# Patient Record
Sex: FEMALE | Race: WHITE | NOT HISPANIC OR LATINO | Employment: FULL TIME | URBAN - METROPOLITAN AREA
[De-identification: names, ages, dates, MRNs, and addresses within clinical notes are randomized per-mention and may not be internally consistent; named-entity substitution may affect disease eponyms.]

---

## 2023-03-01 PROBLEM — R10.9 ABDOMINAL PAIN: Status: ACTIVE | Noted: 2023-03-01

## 2023-03-01 PROBLEM — K59.00 CONSTIPATION: Status: ACTIVE | Noted: 2023-03-01

## 2023-03-01 PROBLEM — R00.2 PALPITATIONS: Status: ACTIVE | Noted: 2023-03-01

## 2023-03-01 PROBLEM — L25.9 CONTACT DERMATITIS: Status: ACTIVE | Noted: 2023-03-01

## 2023-03-01 PROBLEM — M81.0 OSTEOPOROSIS: Status: ACTIVE | Noted: 2023-03-01

## 2023-03-01 PROBLEM — K21.9 GERD (GASTROESOPHAGEAL REFLUX DISEASE): Status: ACTIVE | Noted: 2023-03-01

## 2023-03-01 PROBLEM — K58.0 IRRITABLE BOWEL SYNDROME WITH DIARRHEA: Status: ACTIVE | Noted: 2023-03-01

## 2023-03-01 PROBLEM — K42.9 UMBILICAL HERNIA WITHOUT OBSTRUCTION AND WITHOUT GANGRENE: Status: ACTIVE | Noted: 2023-03-01

## 2023-03-01 PROBLEM — R42 DIZZINESS: Status: ACTIVE | Noted: 2023-03-01

## 2023-03-01 PROBLEM — R23.2 FLUSHING: Status: ACTIVE | Noted: 2023-03-01

## 2023-03-01 PROBLEM — R76.8 POSITIVE ANA (ANTINUCLEAR ANTIBODY): Status: ACTIVE | Noted: 2023-03-01

## 2023-03-01 PROBLEM — R51.9 HEADACHE: Status: ACTIVE | Noted: 2023-03-01

## 2023-03-01 PROBLEM — R32 URINARY INCONTINENCE: Status: ACTIVE | Noted: 2023-03-01

## 2023-03-01 PROBLEM — M54.9 BACK PAIN: Status: ACTIVE | Noted: 2023-03-01

## 2023-03-01 PROBLEM — M79.10 MYALGIA: Status: ACTIVE | Noted: 2023-03-01

## 2023-03-01 PROBLEM — D64.9 ANEMIA: Status: ACTIVE | Noted: 2023-03-01

## 2023-03-01 PROBLEM — R74.8 ELEVATED LIVER ENZYMES: Status: ACTIVE | Noted: 2023-03-01

## 2023-03-01 PROBLEM — R14.0 BLOATING: Status: ACTIVE | Noted: 2023-03-01

## 2023-03-01 PROBLEM — R30.0 DYSURIA: Status: ACTIVE | Noted: 2023-03-01

## 2023-03-01 PROBLEM — K63.89 INTESTINAL BACTERIAL OVERGROWTH: Status: ACTIVE | Noted: 2023-03-01

## 2023-03-01 PROBLEM — F41.9 ANXIETY: Status: ACTIVE | Noted: 2023-03-01

## 2023-03-01 RX ORDER — ACETAMINOPHEN 500 MG
100 TABLET ORAL
COMMUNITY

## 2023-03-01 RX ORDER — PANTOPRAZOLE SODIUM 40 MG/1
40 TABLET, DELAYED RELEASE ORAL DAILY
COMMUNITY
Start: 2019-08-05 | End: 2023-06-14 | Stop reason: SDUPTHER

## 2023-03-07 ENCOUNTER — APPOINTMENT (OUTPATIENT)
Dept: PRIMARY CARE | Facility: CLINIC | Age: 48
End: 2023-03-07
Payer: COMMERCIAL

## 2023-06-14 ENCOUNTER — LAB (OUTPATIENT)
Dept: LAB | Facility: LAB | Age: 48
End: 2023-06-14
Payer: COMMERCIAL

## 2023-06-14 ENCOUNTER — OFFICE VISIT (OUTPATIENT)
Dept: PRIMARY CARE | Facility: CLINIC | Age: 48
End: 2023-06-14
Payer: COMMERCIAL

## 2023-06-14 VITALS
DIASTOLIC BLOOD PRESSURE: 80 MMHG | HEIGHT: 65 IN | WEIGHT: 126 LBS | OXYGEN SATURATION: 97 % | SYSTOLIC BLOOD PRESSURE: 116 MMHG | HEART RATE: 70 BPM | BODY MASS INDEX: 20.99 KG/M2 | TEMPERATURE: 98.1 F

## 2023-06-14 DIAGNOSIS — J30.1 ALLERGIC RHINITIS DUE TO POLLEN, UNSPECIFIED SEASONALITY: ICD-10-CM

## 2023-06-14 DIAGNOSIS — R53.82 CHRONIC FATIGUE: ICD-10-CM

## 2023-06-14 DIAGNOSIS — Z13.220 LIPID SCREENING: ICD-10-CM

## 2023-06-14 DIAGNOSIS — K21.00 GASTROESOPHAGEAL REFLUX DISEASE WITH ESOPHAGITIS, UNSPECIFIED WHETHER HEMORRHAGE: ICD-10-CM

## 2023-06-14 DIAGNOSIS — D64.9 ANEMIA, UNSPECIFIED TYPE: ICD-10-CM

## 2023-06-14 DIAGNOSIS — M32.8 OTHER FORMS OF SYSTEMIC LUPUS ERYTHEMATOSUS, UNSPECIFIED ORGAN INVOLVEMENT STATUS (MULTI): ICD-10-CM

## 2023-06-14 DIAGNOSIS — Z12.31 VISIT FOR SCREENING MAMMOGRAM: ICD-10-CM

## 2023-06-14 DIAGNOSIS — M25.50 PAIN IN JOINT, MULTIPLE SITES: ICD-10-CM

## 2023-06-14 DIAGNOSIS — M25.50 ARTHRALGIA OF MULTIPLE JOINTS: ICD-10-CM

## 2023-06-14 DIAGNOSIS — R76.8 POSITIVE ANA (ANTINUCLEAR ANTIBODY): Primary | ICD-10-CM

## 2023-06-14 DIAGNOSIS — R76.8 POSITIVE ANA (ANTINUCLEAR ANTIBODY): ICD-10-CM

## 2023-06-14 LAB
ALANINE AMINOTRANSFERASE (SGPT) (U/L) IN SER/PLAS: 15 U/L (ref 7–45)
ALBUMIN (G/DL) IN SER/PLAS: 4.2 G/DL (ref 3.4–5)
ALKALINE PHOSPHATASE (U/L) IN SER/PLAS: 35 U/L (ref 33–110)
ANION GAP IN SER/PLAS: 11 MMOL/L (ref 10–20)
ASPARTATE AMINOTRANSFERASE (SGOT) (U/L) IN SER/PLAS: 17 U/L (ref 9–39)
BILIRUBIN TOTAL (MG/DL) IN SER/PLAS: 0.5 MG/DL (ref 0–1.2)
CALCIUM (MG/DL) IN SER/PLAS: 9.9 MG/DL (ref 8.6–10.3)
CARBON DIOXIDE, TOTAL (MMOL/L) IN SER/PLAS: 27 MMOL/L (ref 21–32)
CHLORIDE (MMOL/L) IN SER/PLAS: 103 MMOL/L (ref 98–107)
CREATININE (MG/DL) IN SER/PLAS: 0.78 MG/DL (ref 0.5–1.05)
ERYTHROCYTE DISTRIBUTION WIDTH (RATIO) BY AUTOMATED COUNT: 15 % (ref 11.5–14.5)
ERYTHROCYTE MEAN CORPUSCULAR HEMOGLOBIN CONCENTRATION (G/DL) BY AUTOMATED: 31.5 G/DL (ref 32–36)
ERYTHROCYTE MEAN CORPUSCULAR VOLUME (FL) BY AUTOMATED COUNT: 93 FL (ref 80–100)
ERYTHROCYTES (10*6/UL) IN BLOOD BY AUTOMATED COUNT: 3.83 X10E12/L (ref 4–5.2)
GFR FEMALE: >90 ML/MIN/1.73M2
GLUCOSE (MG/DL) IN SER/PLAS: 81 MG/DL (ref 74–99)
HEMATOCRIT (%) IN BLOOD BY AUTOMATED COUNT: 35.5 % (ref 36–46)
HEMOGLOBIN (G/DL) IN BLOOD: 11.2 G/DL (ref 12–16)
IRON (UG/DL) IN SER/PLAS: 89 UG/DL (ref 35–150)
LEUKOCYTES (10*3/UL) IN BLOOD BY AUTOMATED COUNT: 4.8 X10E9/L (ref 4.4–11.3)
PLATELETS (10*3/UL) IN BLOOD AUTOMATED COUNT: 209 X10E9/L (ref 150–450)
POTASSIUM (MMOL/L) IN SER/PLAS: 4.1 MMOL/L (ref 3.5–5.3)
PROTEIN TOTAL: 6.5 G/DL (ref 6.4–8.2)
SEDIMENTATION RATE, ERYTHROCYTE: 4 MM/H (ref 0–20)
SODIUM (MMOL/L) IN SER/PLAS: 137 MMOL/L (ref 136–145)
THYROTROPIN (MIU/L) IN SER/PLAS BY DETECTION LIMIT <= 0.05 MIU/L: 1.73 MIU/L (ref 0.44–3.98)
THYROXINE (T4) FREE (NG/DL) IN SER/PLAS: 0.83 NG/DL (ref 0.61–1.12)
UREA NITROGEN (MG/DL) IN SER/PLAS: 17 MG/DL (ref 6–23)

## 2023-06-14 PROCEDURE — 83540 ASSAY OF IRON: CPT

## 2023-06-14 PROCEDURE — 84443 ASSAY THYROID STIM HORMONE: CPT

## 2023-06-14 PROCEDURE — 86038 ANTINUCLEAR ANTIBODIES: CPT

## 2023-06-14 PROCEDURE — 82607 VITAMIN B-12: CPT

## 2023-06-14 PROCEDURE — 86225 DNA ANTIBODY NATIVE: CPT

## 2023-06-14 PROCEDURE — 80053 COMPREHEN METABOLIC PANEL: CPT

## 2023-06-14 PROCEDURE — 82785 ASSAY OF IGE: CPT

## 2023-06-14 PROCEDURE — 85652 RBC SED RATE AUTOMATED: CPT

## 2023-06-14 PROCEDURE — 36415 COLL VENOUS BLD VENIPUNCTURE: CPT

## 2023-06-14 PROCEDURE — 85027 COMPLETE CBC AUTOMATED: CPT

## 2023-06-14 PROCEDURE — 99214 OFFICE O/P EST MOD 30 MIN: CPT | Performed by: FAMILY MEDICINE

## 2023-06-14 PROCEDURE — 82306 VITAMIN D 25 HYDROXY: CPT

## 2023-06-14 PROCEDURE — 86235 NUCLEAR ANTIGEN ANTIBODY: CPT

## 2023-06-14 PROCEDURE — 86003 ALLG SPEC IGE CRUDE XTRC EA: CPT

## 2023-06-14 PROCEDURE — 84439 ASSAY OF FREE THYROXINE: CPT

## 2023-06-14 PROCEDURE — 86039 ANTINUCLEAR ANTIBODIES (ANA): CPT

## 2023-06-14 RX ORDER — PANTOPRAZOLE SODIUM 40 MG/1
40 TABLET, DELAYED RELEASE ORAL DAILY
Qty: 90 TABLET | Refills: 3 | Status: SHIPPED | OUTPATIENT
Start: 2023-06-14 | End: 2024-05-03 | Stop reason: SDUPTHER

## 2023-06-14 ASSESSMENT — ENCOUNTER SYMPTOMS
SHORTNESS OF BREATH: 0
ARTHRALGIAS: 1
COUGH: 0

## 2023-06-14 NOTE — PATIENT INSTRUCTIONS
Get your blood work as ordered.  You should hear from our office with results whether they are normal are not within a few days.  Please call the office if you do not hear from us.     Recommend blood work to reassess where you are with your autoimmune markers  We need to check electrolytes, thyroid, anemia.    Allergy treatment :  You can take over-the-counter allergy medications.  There are all pretty similar: Claritin, Allegra, Zyrtec and Xyzal.  You can also try Benadryl however that is more likely to make you tired.  If the oral medication is not sufficient, you can add on an over-the-counter nasal spray like Flonase or Nasacort, his nasal sprays take a few days to notice improvement.  There are a couple of over-the-counter eyedrops that work well : Zaditor and Alaway.  If you continue to have symptoms and these medications are not helping, follow-up in the office as there are prescription medications we could add on if we need to.     GERD  Take prescribed medication as written.  May take TUMS or Rolaids as needed.  No eating within 2 hours of going to bed.  May want to elevate the head of your bed.  Avoid caffeine, chocolate, alcohol, spicy foods, acidic foods, fried foods, mint flavoring.  Call or return to the office if your symptoms change,  worsen, or fail to improve.  It may take 2 weeks for the medication to take effect.     There are several recommendations for colon cancer screening.  The new recommendations include screening at the age of 45 and above.  Colonoscopy generally is the best test for colon cancer screening.  This involves a procedure GI doctor looks at your colon through a camera.  The benefit of doing the colonoscopy as polyps can be removed during the colonoscopy to help prevent cancer.  This involves light sedation and requires cleaning out the colon before the procedure.  If you do not have any substantial polyps this is done every 10 years.  The other option for colon cancer  screening is Cologuard stool tests.  These are almost as effective with regards to picking up colon cancer.  However, they do not  polyps so its not preventing cancer.  These are done every 3 years.  If you have a positive Cologuard test then you need to proceed with a colonoscopy.     Mammogram

## 2023-06-14 NOTE — PROGRESS NOTES
"Subjective   Patient ID: Mee Quiñonez is a 47 y.o. female who presents for fatigue . States she was diagnosed with Lupus but has not been to see Rheumatology at least 7 or 8 yrs. States she also has sinus drainage; using OTC Flonase nasal spray.     Fatigue chronically       Had cOVID in [place] , periods were a little off after that   Periods a little erratic over last few months     Not too heavy     Sleeping ok   More fatigue during day     Diagnosed with SLE ,  saw Dr Bush ,    As having eating d/o at that time   Does get joint pains and redness in face     GERD :  taking zegerid twice daily ,      Chronic PND   Some lymph node on left   Runny nose   Doing flonase with some relief       No mammogram   Due for GYN              Review of Systems   HENT:  Positive for ear pain and postnasal drip.    Respiratory:  Negative for cough and shortness of breath.    Musculoskeletal:  Positive for arthralgias.       Objective   Ht 1.638 m (5' 4.5\")   Wt 57.2 kg (126 lb)   BMI 21.29 kg/m²     Physical Exam  Constitutional:       Appearance: Normal appearance.   HENT:      Head: Normocephalic and atraumatic.      Right Ear: Tympanic membrane and ear canal normal.      Left Ear: Tympanic membrane and ear canal normal.      Nose: No nasal deformity.      Right Sinus: No maxillary sinus tenderness or frontal sinus tenderness.      Left Sinus: No maxillary sinus tenderness or frontal sinus tenderness.      Comments: Pale      Mouth/Throat:      Mouth: Mucous membranes are moist. No oral lesions.      Tongue: No lesions.      Pharynx: Oropharynx is clear.   Cardiovascular:      Rate and Rhythm: Normal rate and regular rhythm.   Pulmonary:      Effort: Pulmonary effort is normal.      Breath sounds: Normal breath sounds.   Musculoskeletal:      Cervical back: No tenderness.   Lymphadenopathy:      Cervical: No cervical adenopathy.   Neurological:      Mental Status: She is alert.         Assessment/Plan   Problem List " Items Addressed This Visit       Anemia, unspecified    GERD (gastroesophageal reflux disease)    Positive MARION (antinuclear antibody) - Primary     Other Visit Diagnoses       Chronic fatigue        Acute non-recurrent maxillary sinusitis        Lipid screening

## 2023-06-15 LAB
ALLERGEN ANIMAL: CAT DANDER IGE (KU/L): <0.1 KU/L
ALLERGEN ANIMAL: DOG DANDER IGE (KU/L): <0.1 KU/L
ALLERGEN GRASS: BERMUDA GRASS (CYNODON DACTYLON) IGE (KU/L): <0.1 KU/L
ALLERGEN GRASS: JOHNSON GRASS (SORGHUM HALEPENSE) IGE (KU/L): <0.1 KU/L
ALLERGEN GRASS: MEADOW GRASS, KENTUCKY BLUE (POA PRATENSIS )IGE (KU/L): <0.1 KU/L
ALLERGEN GRASS: TIMOTHY GRASS (PHLEUM PRATENSE) IGE (KU/L): <0.1 KU/L
ALLERGEN INSECT: COCKROACH IGE: <0.1 KU/L
ALLERGEN MICROORGANISM: ALTERNARIA ALTERNATA IGE (KU/L): <0.1 KU/L
ALLERGEN MICROORGANISM: ASPERGILLUS FUMIGATUS IGE (KU/L): <0.1 KU/L
ALLERGEN MICROORGANISM: CLADOSPORIUM HERBARUM IGE (KU/L): <0.1 KU/L
ALLERGEN MICROORGANISM: PENICILLIUM CHRYSOGENUM (P. NOTATUM) IGE (KU/L): <0.1 KU/L
ALLERGEN MITE: DERMATOPHAGOIDES FARINAE (HOUSE DUST MITE) IGE (KU/L): <0.1 KU/L
ALLERGEN MITE: DERMATOPHAGOIDES PTERONYSSINUS (HOUSE DUST MITE) IGE (KU/L): <0.1 KU/L
ALLERGEN TREE: BOX-ELDER (ACER NEGUNDO) IGE (KU/L): <0.1 KU/L
ALLERGEN TREE: COMMON SILVER BIRCH (BETULA VERRUCOSA) IGE (KU/L): <0.1 KU/L
ALLERGEN TREE: COTTONWOOD (POPULUS DELTOIDES) IGE (KU/L): <0.1 KU/L
ALLERGEN TREE: ELM (ULMUS AMERICANA) IGE (KU/L): <0.1 KU/L
ALLERGEN TREE: MAPLE LEAF SYCAMORE, LONDON PLANE IGE (KU/L): <0.1 KU/L
ALLERGEN TREE: MOUNTAIN JUNIPER (JUNIPERUS SABINOIDES) IGE (KU/L): <0.1 KU/L
ALLERGEN TREE: MULBERRY (MORUS ALBA) IGE (KU/L): <0.1 KU/L
ALLERGEN TREE: OAK (QUERCUS ALBA) IGE (KU/L): <0.1 KU/L
ALLERGEN TREE: PECAN, HICKORY (CARYA PECAN) IGE (KU/L): <0.1 KU/L
ALLERGEN TREE: WALNUT IGE: <0.1 KU/L
ALLERGEN TREE: WHITE ASH (FRAXINUS AMERICANA) IGE (KU/L): <0.1 KU/L
ALLERGEN WEED: COMMON PIGWEED (AMARANTHUS RETROFLEXUS) IGE (KU/L): <0.1 KU/L
ALLERGEN WEED: COMMON RAGWEED (AMB. ARTEMISIIFOLIA/A. ELATIOR) IGE (KU/L): <0.1 KU/L
ALLERGEN WEED: GOOSEFOOT, LAMB'S QUARTERS (CHENOPODIUM ALBUM) IGE (KU/L): <0.1 KU/L
ALLERGEN WEED: PLANTAIN (ENGLISH), RIBWORT (PLANTAGO LANCEOLATA) IGE (KU/L): <0.1 KU/L
ALLERGEN WEED: PRICKLY SALTWORT/RUSSIAN THISTLE (SALSOLA KALI) IGE (KU/L): <0.1 KU/L
ALLERGEN WEED: SHEEP SORREL (RUMEX ACETOSELLA) IGE (KU/L): <0.1 KU/L
ANA PATTERN: ABNORMAL
ANA TITER: ABNORMAL
ANTI-CENTROMERE: <0.2 AI
ANTI-CHROMATIN: <0.2 AI
ANTI-DNA (DS): 2 IU/ML
ANTI-JO-1 IGG: <0.2 AI
ANTI-NUCLEAR ANTIBODY (ANA): POSITIVE
ANTI-RIBOSOMAL P: <0.2 AI
ANTI-RNP: <0.2 AI
ANTI-SCL-70: <0.2 AI
ANTI-SM/RNP: <0.2 AI
ANTI-SM: <0.2 AI
ANTI-SSA: <0.2 AI
ANTI-SSB: <0.2 AI
CALCIDIOL (25 OH VITAMIN D3) (NG/ML) IN SER/PLAS: 39 NG/ML
COBALAMIN (VITAMIN B12) (PG/ML) IN SER/PLAS: 416 PG/ML (ref 211–911)
IMMUNOCAP IGE: <2 KU/L (ref 0–214)
IMMUNOCAP INTERPRETATION: NORMAL

## 2023-06-16 NOTE — RESULT ENCOUNTER NOTE
Please notify pt of lab results she still has a positive MARION, similar pattern, mildly elevated without a specific elevation in individual antibodies, follow-up with rheumatology as discussed  Very slight anemia, rest of blood work generally normal, allergy panel, vitamin D, iron, thyroid

## 2023-06-19 ENCOUNTER — TELEPHONE (OUTPATIENT)
Dept: PRIMARY CARE | Facility: CLINIC | Age: 48
End: 2023-06-19
Payer: COMMERCIAL

## 2023-06-19 NOTE — TELEPHONE ENCOUNTER
----- Message from Savannah Woods MD sent at 6/16/2023  9:44 AM EDT -----  Please notify pt of lab results she still has a positive MARION, similar pattern, mildly elevated without a specific elevation in individual antibodies, follow-up with rheumatology as discussed  Very slight anemia, rest of blood work generally normal, allergy panel, vitamin D, iron, thyroid

## 2023-06-19 NOTE — TELEPHONE ENCOUNTER
Result Communication    Resulted Orders   CBC   Result Value Ref Range    WBC 4.8 4.4 - 11.3 x10E9/L    RBC 3.83 (L) 4.00 - 5.20 x10E12/L    Hemoglobin 11.2 (L) 12.0 - 16.0 g/dL    Hematocrit 35.5 (L) 36.0 - 46.0 %    MCV 93 80 - 100 fL    MCHC 31.5 (L) 32.0 - 36.0 g/dL    Platelets 209 150 - 450 x10E9/L    RDW 15.0 (H) 11.5 - 14.5 %   Comprehensive Metabolic Panel   Result Value Ref Range    Glucose 81 74 - 99 mg/dL    Sodium 137 136 - 145 mmol/L    Potassium 4.1 3.5 - 5.3 mmol/L    Chloride 103 98 - 107 mmol/L    Bicarbonate 27 21 - 32 mmol/L    Anion Gap 11 10 - 20 mmol/L    Urea Nitrogen 17 6 - 23 mg/dL    Creatinine 0.78 0.50 - 1.05 mg/dL    GFR Female >90 >90 mL/min/1.73m2      Comment:       CALCULATIONS OF ESTIMATED GFR ARE PERFORMED   USING THE 2021 CKD-EPI STUDY REFIT EQUATION   WITHOUT THE RACE VARIABLE FOR THE IDMS-TRACEABLE   CREATININE METHODS.    https://jasn.asnjournals.org/content/early/2021/09/22/ASN.9630246986    Calcium 9.9 8.6 - 10.3 mg/dL    Albumin 4.2 3.4 - 5.0 g/dL    Alkaline Phosphatase 35 33 - 110 U/L    Total Protein 6.5 6.4 - 8.2 g/dL    AST 17 9 - 39 U/L    Total Bilirubin 0.5 0.0 - 1.2 mg/dL    ALT (SGPT) 15 7 - 45 U/L      Comment:       Patients treated with Sulfasalazine may generate    falsely decreased results for ALT.   Thyroid Stimulating Hormone   Result Value Ref Range    TSH 1.73 0.44 - 3.98 mIU/L      Comment:       TSH testing is performed using different testing    methodology at [facility] than at other    [facility]. Direct result comparisons should    only be made within the same method.   Vitamin B12   Result Value Ref Range    Vitamin B-12 416 211 - 911 pg/mL   Thyroxine, Free   Result Value Ref Range    Free T4 0.83 0.61 - 1.12 ng/dL      Comment:       Thyroxine Free testing is performed using different testing    methodology at [facility] than at other    [facility]. Direct result comparisons should    only be made within  the same method.  .   Biotin can cause falsely elevated free T4 results. Patients   taking a Biotin dose of up to 10 mg/day should refrain from   taking Biotin for 24 hours before sample collection. Patient   taking a Biotin dose of >10 mg/day should consult with their   physician or the laboratory before the blood draw.   Iron   Result Value Ref Range    Iron 89 35 - 150 ug/dL   Vitamin D, Total   Result Value Ref Range    Vitamin D, 25-Hydroxy 39 ng/mL      Comment:      .  DEFICIENCY:         < 20   NG/ML  INSUFFICIENCY:      20-29  NG/ML  SUFFICIENCY:         NG/ML    THIS ASSAY ACCURATELY QUANTIFIES THE SUM OF  VITAMIN D3, 25-HYDROXY AND VIT D2,25-HYDROXY.   MARION with Reflex to VINAYAK   Result Value Ref Range    ANTI-NUCLEAR ANTIBODY (MARION) POSITIVE (A) NEGATIVE      Comment:        The Antinuclear Antibody (MARION) test was performed using    indirect immunofluorescence assay with HEp-2 cells slide.    MARION Titer 1:80 <1:80    MARION Pattern HOMOGENEOUS    Sedimentation Rate   Result Value Ref Range    Sedimentation Rate 4 0 - 20 mm/h   Respiratory Allergy Profile IgE   Result Value Ref Range    Immunocap IgE <2.0 0.0 - 214.0 KU/L      Comment:       Note:  Omalizumab (Xolair, GeneYesweplay; humanized    IgG1 antihuman IgE Fc) treatment does not    significantly interfere with the accuracy of    total IgE on the ImmunoCAP (Plan A Drink) platform.    J Allergy Clin Immunol 2006;117:759-66).   Allergens, parasitic diseases, smoking, and   alcohol consumption have been reported to   increase levels of total IgE in serum.    B[place] Grass IgE <0.10 <0.35 KU/L      Comment:        SEE IMMUNOCAP INTERP.IGE     Zhao Grass IgE <0.10 <0.35 KU/L      Comment:        SEE IMMUNOCAP INTERP.IGE     [place] Grass, [place] Blue IgE <0.10 <0.35 KU/L      Comment:        SEE IMMUNOCAP INTERP.IGE     Karthik Grass IgE <0.10 <0.35 KU/L      Comment:        SEE IMMUNOCAP INTERP.IGE     [place], Lamb's Quarters IgE <0.10 <0.35 KU/L       Comment:        SEE IMMUNOCAP INTERP.IGE     Common Pigweed IgE <0.10 <0.35 KU/L      Comment:        SEE IMMUNOCAP INTERP.IGE     Common Ragweed IgE <0.10 <0.35 KU/L      Comment:        SEE IMMUNOCAP INTERP.IGE     White Stephon IgE <0.10 <0.35 KU/L      Comment:        SEE IMMUNOCAP INTERP.IGE     Common Silver Birch IgE <0.10 <0.35 KU/L      Comment:        SEE IMMUNOCAP INTERP.IGE     Box-Elder IgE <0.10 <0.35 KU/L      Comment:        SEE IMMUNOCAP INTERP.IGE     Mountain Juniper IgE <0.10 <0.35 KU/L      Comment:        SEE IMMUNOCAP INTERP.IGE     [place] IgE <0.10 <0.35 KU/L      Comment:        SEE IMMUNOCAP INTERP.IGE     Elm IgE <0.10 <0.35 KU/L      Comment:        SEE IMMUNOCAP INTERP.IGE     [place] IgE <0.10 <0.35 KU/L      Comment:        SEE IMMUNOCAP INTERP.IGE     Oak IgE <0.10 <0.35 KU/L      Comment:        SEE IMMUNOCAP INTERP.IGE     Pecan, Hickory IgE <0.10 <0.35 KU/L      Comment:        SEE IMMUNOCAP INTERP.IGE     [place]le [place], [place] Plane IgE <0.10 <0.35 KU/L      Comment:        SEE IMMUNOCAP INTERP.IGE     Dalton Tree IgE <0.10 <0.35 KU/L      Comment:        SEE IMMUNOCAP INTERP.IGE     Prickly Saltwort/Russian Thistle IgE <0.10 <0.35 KU/L      Comment:        SEE IMMUNOCAP INTERP.IGE     Sheep Sorrel IgE <0.10 <0.35 KU/L      Comment:        SEE IMMUNOCAP INTERP.IGE     Cat Dander IgE <0.10 <0.35 KU/L      Comment:        SEE IMMUNOCAP INTERP.IGE     Dog Dander IgE <0.10 <0.35 KU/L      Comment:        SEE IMMUNOCAP INTERP.IGE     Alternaria Alternata IgE <0.10 <0.35 KU/L      Comment:        SEE IMMUNOCAP INTERP.IGE     Aspergillus Fumigatus IgE <0.10 <0.35 KU/L      Comment:        SEE IMMUNOCAP INTERP.IGE     Cladosporium Herbarum IgE <0.10 <0.35 KU/L      Comment:        SEE IMMUNOCAP INTERP.IGE     Penicillium Chrysogenum (P. notatum) IgE <0.10 <0.35 KU/L      Comment:        SEE IMMUNOCAP INTERP.IGE     Plantain IgE <0.10 <0.35 KU/L      Comment:        SEE IMMUNOCAP  INTERP.IGE     Dust Mite (D. farinae) IgE <0.10 <0.35 KU/L      Comment:        SEE IMMUNOCAP INTERP.IGE     Dust Mite (D. pteronyssinus) IgE <0.10 <0.35 KU/L      Comment:        SEE IMMUNOCAP INTERP.IGE     Greek Cockroach IgE <0.10 <0.35 KU/L      Comment:        SEE IMMUNOCAP INTERP.IGE     Immunocap Interpretation SEE COMMENT       Comment:           REFERENCE RANGE (IMMUNOCAP) IGE   KU/L           CLASS     INTERPRETATION       <  0.10       0       BELOW DETECTION   0.10-  0.34      0/1      EQUIVOCAL   0.35-  0.69       1       LOW POSITIVE   0.70-  3.49       2       MODERATE POSITIVE   3.50- 17.49       3       HIGH POSITIVE  17.50- 49          4       VERY HIGH POSITIVE  50   - 99          5       VERY HIGH POSITIVE       >100          6       VERY HIGH POSITIVE   VINAYAK Panel   Result Value Ref Range    Anti-SM <0.2 AI      Comment:      REF VALUES   < 1.0 = NEGATIVE   >=1.0 = POSITIVE    Anti-RNP <0.2 AI      Comment:      REF VALUES   < 1.0 = NEGATIVE   >=1.0 = POSITIVE    Anti-SM/RNP <0.2 AI      Comment:      REF VALUES   < 1.0 = NEGATIVE   >=1.0 = POSITIVE    Anti-SSA <0.2 AI      Comment:      REF VALUES   < 1.0 = NEGATIVE   >=1.0 = POSITIVE    Anti-SSB <0.2 AI      Comment:      REF VALUES   < 1.0 = NEGATIVE   >=1.0 = POSITIVE    Anti-SCL-70 <0.2 AI      Comment:      REF VALUES   < 1.0 = NEGATIVE   >=1.0 = POSITIVE    Anti-AMARJIT-1 IgG <0.2 AI      Comment:      REF VALUES   < 1.0 = NEGATIVE   >=1.0 = POSITIVE    Anti-Chromatin <0.2 AI      Comment:      REF VALUES   < 1.0 = NEGATIVE   >=1.0 = POSITIVE    Anti-Centromere <0.2 AI      Comment:      REF VALUES   < 1.0 = NEGATIVE   >=1.0 = POSITIVE    Anti-Ribosomal P <0.2 AI      Comment:      REF VALUES   < 1.0 = NEGATIVE   >=1.0 = POSITIVE    Anti-DNA (DS) 2.0 IU/mL      Comment:      REF VALUES  NEGATIVE:    <= 4 IU/ML  EQUIVOCAL:   5- 9 IU/ML  POSITIVE:    >=10 IU/ML       8:53 AM      Results were successfully communicated with the patient and they  acknowledged their understanding.

## 2023-06-29 ENCOUNTER — TELEPHONE (OUTPATIENT)
Dept: PRIMARY CARE | Facility: CLINIC | Age: 48
End: 2023-06-29
Payer: COMMERCIAL

## 2024-01-03 ENCOUNTER — TELEPHONE (OUTPATIENT)
Dept: PRIMARY CARE | Facility: CLINIC | Age: 49
End: 2024-01-03
Payer: COMMERCIAL

## 2024-01-03 NOTE — TELEPHONE ENCOUNTER
Per a medical records pull by [facility] for [employer] inquiry, pt has a dx of lupus that the insurance company has pulled into her policy.  The pt states she was cleared of this diagnosis by her last two blood screenings.  Is Dr Woods ok with writing out something for the pt to turn in to the insurance company stating the patient is not currently diagnosed with or being treated for lupus.

## 2024-01-05 ENCOUNTER — TELEPHONE (OUTPATIENT)
Dept: RHEUMATOLOGY | Facility: CLINIC | Age: 49
End: 2024-01-05
Payer: COMMERCIAL

## 2024-01-05 NOTE — TELEPHONE ENCOUNTER
Pt simply  needs a letter stating that she does not have a diagnosis specifically  for lupus and it needs to state the date she last saw you .

## 2024-04-10 ENCOUNTER — OFFICE VISIT (OUTPATIENT)
Dept: PRIMARY CARE | Facility: CLINIC | Age: 49
End: 2024-04-10
Payer: COMMERCIAL

## 2024-04-10 VITALS
HEIGHT: 65 IN | OXYGEN SATURATION: 99 % | BODY MASS INDEX: 20.33 KG/M2 | HEART RATE: 69 BPM | TEMPERATURE: 98.4 F | SYSTOLIC BLOOD PRESSURE: 122 MMHG | DIASTOLIC BLOOD PRESSURE: 83 MMHG | WEIGHT: 122 LBS

## 2024-04-10 DIAGNOSIS — J01.00 ACUTE NON-RECURRENT MAXILLARY SINUSITIS: Primary | ICD-10-CM

## 2024-04-10 PROCEDURE — 99213 OFFICE O/P EST LOW 20 MIN: CPT | Performed by: FAMILY MEDICINE

## 2024-04-10 PROCEDURE — 1036F TOBACCO NON-USER: CPT | Performed by: FAMILY MEDICINE

## 2024-04-10 RX ORDER — AZITHROMYCIN 250 MG/1
TABLET, FILM COATED ORAL DAILY
Qty: 6 TABLET | Refills: 0 | Status: SHIPPED | OUTPATIENT
Start: 2024-04-10 | End: 2024-04-15

## 2024-04-10 ASSESSMENT — PATIENT HEALTH QUESTIONNAIRE - PHQ9
1. LITTLE INTEREST OR PLEASURE IN DOING THINGS: NOT AT ALL
SUM OF ALL RESPONSES TO PHQ9 QUESTIONS 1 AND 2: 0
2. FEELING DOWN, DEPRESSED OR HOPELESS: NOT AT ALL

## 2024-04-10 ASSESSMENT — ENCOUNTER SYMPTOMS
SINUS PRESSURE: 1
HEADACHES: 1
SHORTNESS OF BREATH: 1
COUGH: 0

## 2024-04-10 NOTE — PROGRESS NOTES
"Subjective   Patient ID: Mee Quiñonez is a 48 y.o. female who presents for Sinusitis.    Several weeks     Worse over last few weeks   R> L   Occ blood from nose     Sinusitis  This is a new problem. The current episode started 1 to 4 weeks ago. The problem has been gradually worsening since onset. There has been no fever. Her pain is at a severity of 5/10. The pain is moderate. Associated symptoms include congestion, ear pain, headaches, shortness of breath and sinus pressure. Pertinent negatives include no coughing. Past treatments include saline nose sprays and acetaminophen. The treatment provided no relief.        Review of Systems   HENT:  Positive for congestion, ear pain and sinus pressure.    Respiratory:  Positive for shortness of breath. Negative for cough.    Neurological:  Positive for headaches.       Objective   /83   Pulse 69   Temp 36.9 °C (98.4 °F)   Ht 1.638 m (5' 4.5\")   Wt 55.3 kg (122 lb)   SpO2 99%   BMI 20.62 kg/m²     Physical Exam  Constitutional:       Appearance: Normal appearance.   HENT:      Head: Normocephalic and atraumatic.      Right Ear: Tympanic membrane and ear canal normal.      Left Ear: Tympanic membrane and ear canal normal.      Nose: No nasal deformity.      Right Sinus: Maxillary sinus tenderness present. No frontal sinus tenderness.      Left Sinus: Maxillary sinus tenderness present. No frontal sinus tenderness.      Mouth/Throat:      Mouth: Mucous membranes are moist. No oral lesions.      Tongue: No lesions.      Pharynx: Oropharynx is clear.   Cardiovascular:      Rate and Rhythm: Normal rate and regular rhythm.   Pulmonary:      Effort: Pulmonary effort is normal.      Breath sounds: Normal breath sounds.   Musculoskeletal:      Cervical back: No tenderness.   Lymphadenopathy:      Cervical: No cervical adenopathy.   Neurological:      Mental Status: She is alert.         Assessment/Plan          "

## 2024-04-17 ENCOUNTER — OFFICE VISIT (OUTPATIENT)
Dept: OBSTETRICS AND GYNECOLOGY | Facility: CLINIC | Age: 49
End: 2024-04-17
Payer: COMMERCIAL

## 2024-04-17 VITALS
BODY MASS INDEX: 20.83 KG/M2 | WEIGHT: 125 LBS | SYSTOLIC BLOOD PRESSURE: 125 MMHG | HEIGHT: 65 IN | HEART RATE: 76 BPM | DIASTOLIC BLOOD PRESSURE: 84 MMHG

## 2024-04-17 DIAGNOSIS — Z12.31 ENCOUNTER FOR SCREENING MAMMOGRAM FOR MALIGNANT NEOPLASM OF BREAST: ICD-10-CM

## 2024-04-17 DIAGNOSIS — Z01.419 ENCOUNTER FOR ANNUAL ROUTINE GYNECOLOGICAL EXAMINATION: Primary | ICD-10-CM

## 2024-04-17 PROCEDURE — 87624 HPV HI-RISK TYP POOLED RSLT: CPT

## 2024-04-17 PROCEDURE — 88175 CYTOPATH C/V AUTO FLUID REDO: CPT

## 2024-04-17 PROCEDURE — 99386 PREV VISIT NEW AGE 40-64: CPT | Performed by: OBSTETRICS & GYNECOLOGY

## 2024-04-17 NOTE — PROGRESS NOTES
"Subjective    Mee Quiñonez is a 48 y.o. female who is here for a routine exam.  Periods are spaced out, most recent last week.  +hot flashes, but not too bothersome.    Last pap: 8/2020 Negative, negative HPV  Last mammogram: 6/2023 Negative  PMH: Anemia, anxiety, Raynaud's, IBS, GERD    Review of Systems  Constitutional: no fever, no chills, no recent weight gain, no recent weight loss and no fatigue.   Eyes: no eye pain, no vision problems and no dryness of the eyes.   ENT: no hearing loss, no nosebleeds and no sinus congestion.   Cardiovascular: no chest pain, no palpitations and no orthopnea.   Respiratory: no shortness of breath, no cough and no wheezing.   Gastrointestinal: no abdominal pain, no constipation, no nausea, no diarrhea and no vomiting.   Genitourinary: no dysuria, no urinary incontinence, no vaginal dryness, no vaginal itching, no dyspareunia, no pelvic pain, no dysmenorrhea, no sexual problems, no change in urinary frequency, no vaginal discharge, no unexplained vaginal bleeding and no lesion/sore.   Musculoskeletal: no back pain, no joint swelling and no leg edema.   Integumentary: no rashes, no skin lesions, no nipple discharge, no breast pain and no breast lump.   Neurological: no headache, no numbness and no dizziness.   Psychiatric: no sleep disturbances, no anxiety and no depression.   Endocrine: no hot flashes, no loss of hair and no hirsutism.   Hematologic/Lymphatic: no swollen glands, no tendency for easy bleeding and no tendency for easy bruising.       Objective   /84   Pulse 76   Ht 1.651 m (5' 5\")   Wt 56.7 kg (125 lb)   BMI 20.80 kg/m²        General:   Alert and oriented, in no acute distress   Neck: Supple. No visible thyromegaly.    Breast/Axilla: Normal to palpation bilaterally without masses, skin changes, or nipple discharge.    Abdomen: Soft, non-tender, without masses or organomegaly   Vulva: Normal architecture without erythema, masses, or lesions.  "   Vagina: Normal mucosa without lesions, masses, or atrophy. No abnormal vaginal discharge.    Cervix: Normal without masses, lesions, or signs of cervicitis.    Uterus: Normal mobile, non-enlarged uterus    Adnexa: Normal without masses or lesions   Pelvic Floor No POP noted. No high tone pelvic floor    Psych Normal affect. Normal mood.        Assessment/Plan   Annual exam - discussed diet, exercise, self breast awareness, mammogram and pap guidelines.

## 2024-04-25 LAB
CYTOLOGY CMNT CVX/VAG CYTO-IMP: NORMAL
HPV HR 12 DNA GENITAL QL NAA+PROBE: NEGATIVE
HPV HR GENOTYPES PNL CVX NAA+PROBE: NEGATIVE
HPV16 DNA SPEC QL NAA+PROBE: NEGATIVE
HPV18 DNA SPEC QL NAA+PROBE: NEGATIVE
LAB AP HPV GENOTYPE QUESTION: YES
LAB AP HPV HR: NORMAL
LABORATORY COMMENT REPORT: NORMAL
PATH REPORT.TOTAL CANCER: NORMAL

## 2024-05-03 ENCOUNTER — TELEPHONE (OUTPATIENT)
Dept: PRIMARY CARE | Facility: CLINIC | Age: 49
End: 2024-05-03
Payer: COMMERCIAL

## 2024-05-03 DIAGNOSIS — M25.50 ARTHRALGIA OF MULTIPLE JOINTS: ICD-10-CM

## 2024-05-03 DIAGNOSIS — R76.8 POSITIVE ANA (ANTINUCLEAR ANTIBODY): ICD-10-CM

## 2024-05-03 DIAGNOSIS — Z13.220 LIPID SCREENING: ICD-10-CM

## 2024-05-03 DIAGNOSIS — D64.9 ANEMIA, UNSPECIFIED TYPE: ICD-10-CM

## 2024-05-03 DIAGNOSIS — M25.50 PAIN IN JOINT, MULTIPLE SITES: ICD-10-CM

## 2024-05-03 DIAGNOSIS — R53.82 CHRONIC FATIGUE: ICD-10-CM

## 2024-05-03 DIAGNOSIS — K21.00 GASTROESOPHAGEAL REFLUX DISEASE WITH ESOPHAGITIS, UNSPECIFIED WHETHER HEMORRHAGE: ICD-10-CM

## 2024-05-03 NOTE — TELEPHONE ENCOUNTER
Rx Refill Request Telephone Encounter    Name:  Mee MALIKA Khang  :  [id number]  Medication Name:    PANTOPRAZOLE 40MG Q/D 90 DAY SUPPLY  Specific Pharmacy location:  [facility]   Date of last appointment:  04/10/2024  Date of next appointment:  N/A   Best number to reach patient:  [phone]

## 2024-05-06 RX ORDER — PANTOPRAZOLE SODIUM 40 MG/1
40 TABLET, DELAYED RELEASE ORAL DAILY
Qty: 90 TABLET | Refills: 3 | Status: SHIPPED | OUTPATIENT
Start: 2024-05-06

## 2024-08-26 ENCOUNTER — LAB (OUTPATIENT)
Dept: LAB | Facility: LAB | Age: 49
End: 2024-08-26
Payer: COMMERCIAL

## 2024-08-26 ENCOUNTER — OFFICE VISIT (OUTPATIENT)
Dept: PRIMARY CARE | Facility: CLINIC | Age: 49
End: 2024-08-26
Payer: COMMERCIAL

## 2024-08-26 VITALS
DIASTOLIC BLOOD PRESSURE: 79 MMHG | SYSTOLIC BLOOD PRESSURE: 115 MMHG | OXYGEN SATURATION: 96 % | HEIGHT: 65 IN | BODY MASS INDEX: 20.8 KG/M2 | HEART RATE: 74 BPM

## 2024-08-26 DIAGNOSIS — R53.1 WEAKNESS: ICD-10-CM

## 2024-08-26 DIAGNOSIS — Z13.820 SCREENING FOR OSTEOPOROSIS: ICD-10-CM

## 2024-08-26 DIAGNOSIS — M32.8 OTHER FORMS OF SYSTEMIC LUPUS ERYTHEMATOSUS, UNSPECIFIED ORGAN INVOLVEMENT STATUS (MULTI): ICD-10-CM

## 2024-08-26 DIAGNOSIS — R42 DIZZINESS: ICD-10-CM

## 2024-08-26 DIAGNOSIS — M79.10 MYALGIA: ICD-10-CM

## 2024-08-26 DIAGNOSIS — K59.04 CHRONIC IDIOPATHIC CONSTIPATION: ICD-10-CM

## 2024-08-26 DIAGNOSIS — Z78.0 MENOPAUSE: ICD-10-CM

## 2024-08-26 DIAGNOSIS — R53.1 WEAKNESS: Primary | ICD-10-CM

## 2024-08-26 DIAGNOSIS — R06.09 DOE (DYSPNEA ON EXERTION): ICD-10-CM

## 2024-08-26 DIAGNOSIS — F41.9 ANXIETY: ICD-10-CM

## 2024-08-26 DIAGNOSIS — R20.2 PARESTHESIAS: ICD-10-CM

## 2024-08-26 PROBLEM — R14.0 BLOATING: Status: RESOLVED | Noted: 2023-03-01 | Resolved: 2024-08-26

## 2024-08-26 PROBLEM — R51.9 HEADACHE: Status: RESOLVED | Noted: 2023-03-01 | Resolved: 2024-08-26

## 2024-08-26 PROBLEM — R74.8 ELEVATED LIVER ENZYMES: Status: RESOLVED | Noted: 2023-03-01 | Resolved: 2024-08-26

## 2024-08-26 LAB
ALBUMIN SERPL BCP-MCNC: 4.1 G/DL (ref 3.4–5)
ALP SERPL-CCNC: 41 U/L (ref 33–110)
ALT SERPL W P-5'-P-CCNC: 18 U/L (ref 7–45)
ANION GAP SERPL CALC-SCNC: 12 MMOL/L (ref 10–20)
AST SERPL W P-5'-P-CCNC: 20 U/L (ref 9–39)
BASOPHILS # BLD AUTO: 0.04 X10*3/UL (ref 0–0.1)
BASOPHILS NFR BLD AUTO: 1.1 %
BILIRUB SERPL-MCNC: 0.6 MG/DL (ref 0–1.2)
BUN SERPL-MCNC: 16 MG/DL (ref 6–23)
CALCIUM SERPL-MCNC: 9.9 MG/DL (ref 8.6–10.3)
CHLORIDE SERPL-SCNC: 102 MMOL/L (ref 98–107)
CK SERPL-CCNC: 58 U/L (ref 0–215)
CO2 SERPL-SCNC: 30 MMOL/L (ref 21–32)
CREAT SERPL-MCNC: 0.83 MG/DL (ref 0.5–1.05)
EGFRCR SERPLBLD CKD-EPI 2021: 86 ML/MIN/1.73M*2
EOSINOPHIL # BLD AUTO: 0.1 X10*3/UL (ref 0–0.7)
EOSINOPHIL NFR BLD AUTO: 2.6 %
ERYTHROCYTE [DISTWIDTH] IN BLOOD BY AUTOMATED COUNT: 12.7 % (ref 11.5–14.5)
GLUCOSE SERPL-MCNC: 78 MG/DL (ref 74–99)
HCT VFR BLD AUTO: 39.1 % (ref 36–46)
HGB BLD-MCNC: 12.6 G/DL (ref 12–16)
IMM GRANULOCYTES # BLD AUTO: 0.01 X10*3/UL (ref 0–0.7)
IMM GRANULOCYTES NFR BLD AUTO: 0.3 % (ref 0–0.9)
LYMPHOCYTES # BLD AUTO: 1.46 X10*3/UL (ref 1.2–4.8)
LYMPHOCYTES NFR BLD AUTO: 38.5 %
MCH RBC QN AUTO: 30.1 PG (ref 26–34)
MCHC RBC AUTO-ENTMCNC: 32.2 G/DL (ref 32–36)
MCV RBC AUTO: 94 FL (ref 80–100)
MONOCYTES # BLD AUTO: 0.32 X10*3/UL (ref 0.1–1)
MONOCYTES NFR BLD AUTO: 8.4 %
NEUTROPHILS # BLD AUTO: 1.86 X10*3/UL (ref 1.2–7.7)
NEUTROPHILS NFR BLD AUTO: 49.1 %
NRBC BLD-RTO: 0 /100 WBCS (ref 0–0)
PLATELET # BLD AUTO: 230 X10*3/UL (ref 150–450)
POTASSIUM SERPL-SCNC: 4.5 MMOL/L (ref 3.5–5.3)
PROT SERPL-MCNC: 6.6 G/DL (ref 6.4–8.2)
RBC # BLD AUTO: 4.18 X10*6/UL (ref 4–5.2)
SODIUM SERPL-SCNC: 139 MMOL/L (ref 136–145)
T4 FREE SERPL-MCNC: 0.92 NG/DL (ref 0.61–1.12)
TSH SERPL-ACNC: 1.32 MIU/L (ref 0.44–3.98)
WBC # BLD AUTO: 3.8 X10*3/UL (ref 4.4–11.3)

## 2024-08-26 PROCEDURE — 36415 COLL VENOUS BLD VENIPUNCTURE: CPT

## 2024-08-26 PROCEDURE — 84134 ASSAY OF PREALBUMIN: CPT

## 2024-08-26 PROCEDURE — 85025 COMPLETE CBC W/AUTO DIFF WBC: CPT

## 2024-08-26 PROCEDURE — 84439 ASSAY OF FREE THYROXINE: CPT

## 2024-08-26 PROCEDURE — 93000 ELECTROCARDIOGRAM COMPLETE: CPT | Performed by: FAMILY MEDICINE

## 2024-08-26 PROCEDURE — 82550 ASSAY OF CK (CPK): CPT

## 2024-08-26 PROCEDURE — 82607 VITAMIN B-12: CPT

## 2024-08-26 PROCEDURE — 1036F TOBACCO NON-USER: CPT | Performed by: FAMILY MEDICINE

## 2024-08-26 PROCEDURE — 99214 OFFICE O/P EST MOD 30 MIN: CPT | Performed by: FAMILY MEDICINE

## 2024-08-26 PROCEDURE — 80053 COMPREHEN METABOLIC PANEL: CPT

## 2024-08-26 PROCEDURE — 84443 ASSAY THYROID STIM HORMONE: CPT

## 2024-08-26 RX ORDER — ESCITALOPRAM OXALATE 10 MG/1
10 TABLET ORAL DAILY
Qty: 90 TABLET | Refills: 0 | Status: SHIPPED | OUTPATIENT
Start: 2024-08-26 | End: 2024-11-24

## 2024-08-26 RX ORDER — BISMUTH SUBSALICYLATE 262 MG
1 TABLET,CHEWABLE ORAL DAILY
COMMUNITY

## 2024-08-26 RX ORDER — SENNOSIDES 8.6 MG/1
1 TABLET ORAL DAILY
COMMUNITY

## 2024-08-26 ASSESSMENT — ENCOUNTER SYMPTOMS
SLEEP DISTURBANCE: 0
CONSTIPATION: 1
LIGHT-HEADEDNESS: 1
SHORTNESS OF BREATH: 1
DIARRHEA: 0

## 2024-08-26 NOTE — PATIENT INSTRUCTIONS
Get your blood work as ordered.  You should hear from our office with results whether they are normal are not within a few days.  Please call the office if you do not hear from us.     Chest xray     Constipation  For constipation prevention/treatment:  Drink 8-10 glasses of water per day.  Take a daily fiber supplement Metamucil, 1 heaping tablespoon in 4-8ounces apple or prune juice daily.  Alternative is Metamucil wafer, eating 2 daily washing down with 4-8ounces apple or prune juice.  You can take an OTC stool softener like Colace 1-2 times per day.  In addition to above, if still have issues- use an OTC laxative called Miralax. This works very well and used to be available only by prescription.  Use one heaping tablespoon mixed in 4-8 ounces water IN ADDITION to the metamucil - just take it opposite time of day as metamucil.       Anxiety :  For your anxiety is important that you do activities that contribute to relaxation.  Regular exercise and adequate sleep are very important.  Counseling can be beneficial as well.  If you are  on medications for anxiety is important that you take them as directed and let your physician know if you continue to have symptoms or if your symptoms worsen.  It is also important that you be seen in the office on a regular basis at least every 6 months.

## 2024-08-26 NOTE — PROGRESS NOTES
"Subjective   Patient ID: Mee Quiñonez is a 48 y.o. female who presents for light headed and exhausted.  At the last visit her legs were starting to bother her.  She is wearing compression stockings which is helping some.  She just feel \"off\".  Sometimes she feels like she can't get a good breath, when she bends over and breathes in its better.  She will have episodes where she feels her heart just pumps hard.  Her head feels off and \"funky\".  Pt is trying to separate vertigo and dizziness, and lately she feels like she is become faint and weak.    Pt weight is 124.2 lbs, pt doesn't want her weight on anything she can read    Last several months , intermittent , some days worse than others     Cramping in legs , feels weak , daily   Compression stockings help     Trouble taking deep breath   HR is elevated with exertion     Head feels funky , vertigo at times with moving head   Walking and feels weak   Worse with bending over     Lupus, has seen rheumatology in the past  No meds currently   Joints not bad     Some mm aches   Sometingling in feet bilaterally     Has a history of eating disorder/ anorexia     Sleeping ok , only sleeps 5 hrs   Gets up 2:30 AM goes to bed at 9 PM     Eating:  snacking , small meals , eats dinner ,  some protein and fat   Drinking not great   Drinks coffee     Constipation needs to take sennokot   Gets bloated   Was previously miralax, not helping     Back pain   No chest pain     Anxiety intermittent   Worries a lot   Was on zoloft in past   Lexapro in past     Periods decreasing                  Review of Systems   Respiratory:  Positive for shortness of breath.    Cardiovascular:  Negative for chest pain.   Gastrointestinal:  Positive for constipation. Negative for diarrhea.   Neurological:  Positive for light-headedness.   Psychiatric/Behavioral:  Negative for sleep disturbance.        Objective   /79   Pulse 74   Ht 1.651 m (5' 5\")   SpO2 96%   BMI 20.80 kg/m² "     Physical Exam  Constitutional:       Appearance: Normal appearance.   HENT:      Head: Normocephalic and atraumatic.      Right Ear: Tympanic membrane and ear canal normal.      Left Ear: Tympanic membrane and ear canal normal.      Nose: No nasal deformity.      Right Sinus: No maxillary sinus tenderness or frontal sinus tenderness.      Left Sinus: No maxillary sinus tenderness or frontal sinus tenderness.      Mouth/Throat:      Mouth: Mucous membranes are moist. No oral lesions.      Tongue: No lesions.      Pharynx: Oropharynx is clear.   Cardiovascular:      Rate and Rhythm: Normal rate and regular rhythm.   Pulmonary:      Effort: Pulmonary effort is normal.      Breath sounds: Normal breath sounds.   Musculoskeletal:      Cervical back: No tenderness.   Lymphadenopathy:      Cervical: No cervical adenopathy.   Neurological:      General: No focal deficit present.      Mental Status: She is alert and oriented to person, place, and time.      Cranial Nerves: No cranial nerve deficit.   Psychiatric:         Mood and Affect: Mood normal.         Assessment/Plan   Problem List Items Addressed This Visit             ICD-10-CM    Anxiety F41.9    Relevant Medications    escitalopram (Lexapro) 10 mg tablet    Other Relevant Orders    ECG 12 lead (Clinic Performed) (Completed)    XR chest 2 views    Comprehensive Metabolic Panel    CBC and Auto Differential    Thyroid Stimulating Hormone    Thyroxine, Free    Vitamin B12    Prealbumin    Constipation K59.00    Relevant Orders    ECG 12 lead (Clinic Performed) (Completed)    XR chest 2 views    Comprehensive Metabolic Panel    CBC and Auto Differential    Thyroid Stimulating Hormone    Thyroxine, Free    Vitamin B12    Prealbumin    Dizziness R42    Relevant Orders    ECG 12 lead (Clinic Performed) (Completed)    XR chest 2 views    Comprehensive Metabolic Panel    CBC and Auto Differential    Thyroid Stimulating Hormone    Thyroxine, Free    Vitamin B12     Prealbumin    Myalgia M79.10    Relevant Orders    CK    Prealbumin    Other forms of systemic lupus erythematosus, unspecified organ involvement status (Multi) M32.8    Relevant Orders    ECG 12 lead (Clinic Performed) (Completed)    XR chest 2 views    Comprehensive Metabolic Panel    CBC and Auto Differential    Thyroid Stimulating Hormone    Thyroxine, Free    Vitamin B12    Prealbumin     Other Visit Diagnoses         Codes    Weakness    -  Primary R53.1    Relevant Orders    ECG 12 lead (Clinic Performed) (Completed)    XR chest 2 views    Comprehensive Metabolic Panel    CBC and Auto Differential    Thyroid Stimulating Hormone    Thyroxine, Free    Vitamin B12    Prealbumin    GRACE (dyspnea on exertion)     R06.09    Relevant Orders    ECG 12 lead (Clinic Performed) (Completed)    XR chest 2 views    Comprehensive Metabolic Panel    CBC and Auto Differential    Thyroid Stimulating Hormone    Thyroxine, Free    Vitamin B12    Prealbumin    Menopause     Z78.0    Relevant Orders    XR DEXA bone density    Screening for osteoporosis     Z13.820    Relevant Orders    XR DEXA bone density    Paresthesias     R20.2

## 2024-08-27 LAB
PREALB SERPL-MCNC: 24.5 MG/DL (ref 18–40)
VIT B12 SERPL-MCNC: 852 PG/ML (ref 211–911)

## 2024-10-08 ENCOUNTER — HOSPITAL ENCOUNTER (OUTPATIENT)
Dept: RADIOLOGY | Facility: HOSPITAL | Age: 49
Discharge: HOME | End: 2024-10-08
Payer: COMMERCIAL

## 2024-10-08 DIAGNOSIS — Z78.0 MENOPAUSE: ICD-10-CM

## 2024-10-08 DIAGNOSIS — Z13.820 SCREENING FOR OSTEOPOROSIS: ICD-10-CM

## 2024-10-08 PROCEDURE — 77080 DXA BONE DENSITY AXIAL: CPT

## 2024-10-08 PROCEDURE — 77080 DXA BONE DENSITY AXIAL: CPT | Performed by: STUDENT IN AN ORGANIZED HEALTH CARE EDUCATION/TRAINING PROGRAM

## 2024-10-08 NOTE — RESULT ENCOUNTER NOTE
Your bone density shows osteopenia which is a thinning of the bones.  I recommend that you take vitamin D 1000 units daily, get an adequate intake of calcium daily.  Postmenopausal women should get 1500 mg of calcium per day.  This is about 4-5 servings per day of calcium rich foods such as dairy products or almond milk.  If you are not getting 1500 mg per day and would recommend adding on a calcium supplement.  Also doing weightbearing exercise is helpful.  We should repeat a DEXA scan 2 years.

## 2024-10-17 ENCOUNTER — HOSPITAL ENCOUNTER (OUTPATIENT)
Dept: RADIOLOGY | Facility: HOSPITAL | Age: 49
Discharge: HOME | End: 2024-10-17
Payer: COMMERCIAL

## 2024-10-17 VITALS — WEIGHT: 124 LBS | BODY MASS INDEX: 20.66 KG/M2 | HEIGHT: 65 IN

## 2024-10-17 DIAGNOSIS — Z12.31 ENCOUNTER FOR SCREENING MAMMOGRAM FOR MALIGNANT NEOPLASM OF BREAST: ICD-10-CM

## 2024-10-17 PROCEDURE — 77067 SCR MAMMO BI INCL CAD: CPT

## 2024-10-17 PROCEDURE — 77063 BREAST TOMOSYNTHESIS BI: CPT | Performed by: STUDENT IN AN ORGANIZED HEALTH CARE EDUCATION/TRAINING PROGRAM

## 2024-10-17 PROCEDURE — 77067 SCR MAMMO BI INCL CAD: CPT | Performed by: STUDENT IN AN ORGANIZED HEALTH CARE EDUCATION/TRAINING PROGRAM

## 2025-03-03 ENCOUNTER — APPOINTMENT (OUTPATIENT)
Dept: PRIMARY CARE | Facility: CLINIC | Age: 50
End: 2025-03-03
Payer: COMMERCIAL

## 2025-04-19 DIAGNOSIS — Z13.220 LIPID SCREENING: ICD-10-CM

## 2025-04-19 DIAGNOSIS — D64.9 ANEMIA, UNSPECIFIED TYPE: ICD-10-CM

## 2025-04-19 DIAGNOSIS — K21.00 GASTROESOPHAGEAL REFLUX DISEASE WITH ESOPHAGITIS, UNSPECIFIED WHETHER HEMORRHAGE: ICD-10-CM

## 2025-04-19 DIAGNOSIS — R76.8 POSITIVE ANA (ANTINUCLEAR ANTIBODY): ICD-10-CM

## 2025-04-19 DIAGNOSIS — M25.50 PAIN IN JOINT, MULTIPLE SITES: ICD-10-CM

## 2025-04-19 DIAGNOSIS — M25.50 ARTHRALGIA OF MULTIPLE JOINTS: ICD-10-CM

## 2025-04-19 DIAGNOSIS — R53.82 CHRONIC FATIGUE: ICD-10-CM

## 2025-04-21 RX ORDER — PANTOPRAZOLE SODIUM 40 MG/1
TABLET, DELAYED RELEASE ORAL
Qty: 90 TABLET | Refills: 1 | Status: SHIPPED | OUTPATIENT
Start: 2025-04-21

## 2025-05-09 PROBLEM — L25.9 CONTACT DERMATITIS: Status: RESOLVED | Noted: 2023-03-01 | Resolved: 2025-05-09

## 2025-05-09 PROBLEM — E73.9 LACTOSE INTOLERANCE: Status: ACTIVE | Noted: 2025-05-09

## 2025-05-09 PROBLEM — N76.0 ACUTE VAGINITIS: Status: RESOLVED | Noted: 2025-05-09 | Resolved: 2025-05-09

## 2025-05-13 ENCOUNTER — APPOINTMENT (OUTPATIENT)
Dept: PRIMARY CARE | Facility: CLINIC | Age: 50
End: 2025-05-13
Payer: COMMERCIAL

## 2025-05-13 ENCOUNTER — TELEPHONE (OUTPATIENT)
Dept: PRIMARY CARE | Facility: CLINIC | Age: 50
End: 2025-05-13

## 2025-05-13 VITALS
WEIGHT: 124 LBS | DIASTOLIC BLOOD PRESSURE: 82 MMHG | BODY MASS INDEX: 20.66 KG/M2 | HEART RATE: 68 BPM | OXYGEN SATURATION: 98 % | HEIGHT: 65 IN | SYSTOLIC BLOOD PRESSURE: 123 MMHG | TEMPERATURE: 97 F

## 2025-05-13 DIAGNOSIS — M32.8 OTHER FORMS OF SYSTEMIC LUPUS ERYTHEMATOSUS, UNSPECIFIED ORGAN INVOLVEMENT STATUS (MULTI): ICD-10-CM

## 2025-05-13 DIAGNOSIS — M54.42 CHRONIC LEFT-SIDED LOW BACK PAIN WITH LEFT-SIDED SCIATICA: ICD-10-CM

## 2025-05-13 DIAGNOSIS — F41.9 ANXIETY: ICD-10-CM

## 2025-05-13 DIAGNOSIS — M85.89 OSTEOPENIA OF MULTIPLE SITES: ICD-10-CM

## 2025-05-13 DIAGNOSIS — R76.8 POSITIVE ANA (ANTINUCLEAR ANTIBODY): ICD-10-CM

## 2025-05-13 DIAGNOSIS — R10.32 LLQ PAIN: ICD-10-CM

## 2025-05-13 DIAGNOSIS — G89.29 CHRONIC LEFT-SIDED LOW BACK PAIN WITH LEFT-SIDED SCIATICA: ICD-10-CM

## 2025-05-13 DIAGNOSIS — K59.04 CHRONIC IDIOPATHIC CONSTIPATION: Primary | ICD-10-CM

## 2025-05-13 PROBLEM — R42 DIZZINESS: Status: RESOLVED | Noted: 2023-03-01 | Resolved: 2025-05-13

## 2025-05-13 PROBLEM — R30.0 DYSURIA: Status: RESOLVED | Noted: 2023-03-01 | Resolved: 2025-05-13

## 2025-05-13 PROBLEM — R10.9 ABDOMINAL PAIN: Status: RESOLVED | Noted: 2023-03-01 | Resolved: 2025-05-13

## 2025-05-13 PROBLEM — R23.2 FLUSHING: Status: RESOLVED | Noted: 2023-03-01 | Resolved: 2025-05-13

## 2025-05-13 PROBLEM — M81.0 OSTEOPOROSIS: Status: RESOLVED | Noted: 2023-03-01 | Resolved: 2025-05-13

## 2025-05-13 PROCEDURE — 99214 OFFICE O/P EST MOD 30 MIN: CPT | Performed by: FAMILY MEDICINE

## 2025-05-13 PROCEDURE — 1036F TOBACCO NON-USER: CPT | Performed by: FAMILY MEDICINE

## 2025-05-13 PROCEDURE — 3008F BODY MASS INDEX DOCD: CPT | Performed by: FAMILY MEDICINE

## 2025-05-13 RX ORDER — DULOXETIN HYDROCHLORIDE 30 MG/1
30 CAPSULE, DELAYED RELEASE ORAL DAILY
Qty: 90 CAPSULE | Refills: 1 | Status: SHIPPED | OUTPATIENT
Start: 2025-05-13 | End: 2025-11-09

## 2025-05-13 ASSESSMENT — PATIENT HEALTH QUESTIONNAIRE - PHQ9
1. LITTLE INTEREST OR PLEASURE IN DOING THINGS: NOT AT ALL
2. FEELING DOWN, DEPRESSED OR HOPELESS: NOT AT ALL
SUM OF ALL RESPONSES TO PHQ9 QUESTIONS 1 AND 2: 0

## 2025-05-13 NOTE — PATIENT INSTRUCTIONS
Get your blood work as ordered.  You should hear from our office with results whether they are normal are not within a few days.  Please call the office if you do not hear from us.   Suspect the pain is more myofascial or musculoskeletal could be radicular from the back  Will check pelvic ultrasound to rule out ovary    Xrays     Constipation  For constipation prevention/treatment:  Drink 8-10 glasses of water per day.  Take a daily fiber supplement Metamucil, 1 heaping tablespoon in 4-8ounces apple or prune juice daily.  Alternative is Metamucil wafer, eating 2 daily washing down with 4-8ounces apple or prune juice.  You can take an OTC stool softener like Colace 1-2 times per day.  In addition to above, if still have issues- use an OTC laxative called Miralax. This works very well and used to be available only by prescription.  Use one heaping tablespoon mixed in 4-8 ounces water IN ADDITION to the metamucil - just take it opposite time of day as metamucil.    Started dose linzess   We can increase dose     I discussed the risks and benefits of the medication with patient.  Please call the office if you are having problems with medications.     Trial of cymbalta for anxiety and pain    Discussed side effects

## 2025-05-13 NOTE — PROGRESS NOTES
"Subjective   Patient ID: Mee Quiñonez is a 49 y.o. female who presents for Follow-up (Mee is here today for F/U and also has C/O \"pinch in her belly\" . /Pt has Left lower quadrant pain x 2 months. ).    LLQ pinching pain over the last several months   Occ with twisting , occ with standing     Still with constipation   Failed metamucil   Uses sennokot     Lupus, has seen rheumatology in the past  Has episodes of days with diffuse pain   Not taking medications     Exercising some      Has a history of eating disorder/ anorexia      Sleeping ok , only sleeps 5 hrs   Gets up 2:30 AM goes to bed at 9 PM     Anxiety :  more anxiety lately   Was on zoloft in past   Lexapro in past      Periods stopped last summer     Dose have some sciatica with pain on left              Review of Systems    Objective   /82 (BP Location: Right arm, Patient Position: Sitting)   Pulse 68   Temp 36.1 °C (97 °F) (Temporal)   Ht 1.645 m (5' 4.75\")   Wt 56.2 kg (124 lb)   SpO2 98%   BMI 20.79 kg/m²     Physical Exam  Constitutional:       Appearance: Normal appearance. She is well-developed.   HENT:      Mouth/Throat:      Mouth: Mucous membranes are moist.   Cardiovascular:      Rate and Rhythm: Normal rate and regular rhythm.      Heart sounds: Normal heart sounds. No murmur heard.  Pulmonary:      Effort: Pulmonary effort is normal.      Breath sounds: Normal breath sounds.   Abdominal:      General: Abdomen is flat. There is no distension.      Palpations: Abdomen is soft.      Comments: Abdomen: No palpable masses  No hernias  Area of pain is slightly superior to the left superior iliac crest  Not really reproducible  No palpable masses   Neurological:      General: No focal deficit present.      Mental Status: She is alert and oriented to person, place, and time.   Psychiatric:         Mood and Affect: Mood normal.         Behavior: Behavior normal.         Assessment/Plan   Problem List Items Addressed This Visit  "          ICD-10-CM    Back pain M54.9    Relevant Orders    US pelvis    XR lumbar spine 2-3 views    XR hip left with pelvis when performed 2 or 3 views    Anxiety F41.9    Relevant Medications    DULoxetine (Cymbalta) 30 mg DR capsule    Other Relevant Orders    Sedimentation Rate    CK    MARION with Reflex to VINAYAK    CBC and Auto Differential    Comprehensive Metabolic Panel    Thyroid Stimulating Hormone    Thyroxine, Free    Lipid Panel    Vitamin D 25-Hydroxy,Total (for eval of Vitamin D levels)    US pelvis    Constipation - Primary K59.00    Relevant Medications    DULoxetine (Cymbalta) 30 mg DR capsule    linaCLOtide (Linzess) 72 mcg capsule    Other Relevant Orders    Sedimentation Rate    CK    MARION with Reflex to VINAYAK    CBC and Auto Differential    Comprehensive Metabolic Panel    Thyroid Stimulating Hormone    Thyroxine, Free    Lipid Panel    Vitamin D 25-Hydroxy,Total (for eval of Vitamin D levels)    US pelvis    Positive MARION (antinuclear antibody) R76.8    Relevant Medications    DULoxetine (Cymbalta) 30 mg DR capsule    Other Relevant Orders    Sedimentation Rate    CK    MARION with Reflex to VINAYAK    CBC and Auto Differential    Comprehensive Metabolic Panel    Thyroid Stimulating Hormone    Thyroxine, Free    Lipid Panel    Vitamin D 25-Hydroxy,Total (for eval of Vitamin D levels)    US pelvis    Other forms of systemic lupus erythematosus, unspecified organ involvement status (Multi) M32.8    Relevant Medications    DULoxetine (Cymbalta) 30 mg DR capsule    Other Relevant Orders    Sedimentation Rate    CK    MARION with Reflex to VINAYAK    CBC and Auto Differential    Comprehensive Metabolic Panel    Thyroid Stimulating Hormone    Thyroxine, Free    Lipid Panel    Vitamin D 25-Hydroxy,Total (for eval of Vitamin D levels)    US pelvis     Other Visit Diagnoses         Codes      Osteopenia of multiple sites     M85.89    Relevant Medications    DULoxetine (Cymbalta) 30 mg DR capsule    Other Relevant Orders     Sedimentation Rate    CK    MARION with Reflex to VINAYAK    CBC and Auto Differential    Comprehensive Metabolic Panel    Thyroid Stimulating Hormone    Thyroxine, Free    Lipid Panel    Vitamin D 25-Hydroxy,Total (for eval of Vitamin D levels)    US pelvis      LLQ pain     R10.32    Relevant Orders    US pelvis    XR lumbar spine 2-3 views    XR hip left with pelvis when performed 2 or 3 views

## 2025-05-13 NOTE — TELEPHONE ENCOUNTER
PT called stating that the copay for the Linezz was too expensive and is seeking an alternative medication.    PT would appreciate a call back.

## 2025-05-14 NOTE — TELEPHONE ENCOUNTER
MD Ibis Loredo MA  Caller: Unspecified (Yesterday,  3:51 PM)         05/13/2025 - Patient Call: Perri Santoyo and others  (Newest Message First)             View All Conversations on this Encounter  Savannah Woods MD to Me (Selected Message)        5/14/25  8:28 AM  She needs to check insurance : [employer] or [employer] are the other two in that category     5/13/25  3:58 PM  You routed this conversation to Savannah Woods MD    5/13/25  3:51 PM  Perri Santoyo [facility] this [facility]    5/13/25  3:51 PM  Mee Quiñonez contacted Perri Santoyo  AC    5/13/25  3:51 PM  Note     PT called stating that the copay for the Linezz was too expensive and is seeking an alternative medication.     PT would appreciate a call back.              [place] [place]

## 2025-05-19 ENCOUNTER — APPOINTMENT (OUTPATIENT)
Dept: RADIOLOGY | Facility: HOSPITAL | Age: 50
End: 2025-05-19
Payer: COMMERCIAL

## 2025-05-19 ENCOUNTER — HOSPITAL ENCOUNTER (OUTPATIENT)
Dept: RADIOLOGY | Facility: CLINIC | Age: 50
Discharge: HOME | End: 2025-05-19
Payer: COMMERCIAL

## 2025-05-19 ENCOUNTER — HOSPITAL ENCOUNTER (OUTPATIENT)
Dept: RADIOLOGY | Facility: HOSPITAL | Age: 50
Discharge: HOME | End: 2025-05-19
Payer: COMMERCIAL

## 2025-05-19 DIAGNOSIS — M32.8 OTHER FORMS OF SYSTEMIC LUPUS ERYTHEMATOSUS, UNSPECIFIED ORGAN INVOLVEMENT STATUS (MULTI): ICD-10-CM

## 2025-05-19 DIAGNOSIS — F41.9 ANXIETY: ICD-10-CM

## 2025-05-19 DIAGNOSIS — K59.04 CHRONIC IDIOPATHIC CONSTIPATION: ICD-10-CM

## 2025-05-19 DIAGNOSIS — R10.32 LLQ PAIN: ICD-10-CM

## 2025-05-19 DIAGNOSIS — M85.89 OSTEOPENIA OF MULTIPLE SITES: ICD-10-CM

## 2025-05-19 DIAGNOSIS — R76.8 POSITIVE ANA (ANTINUCLEAR ANTIBODY): ICD-10-CM

## 2025-05-19 DIAGNOSIS — M54.42 CHRONIC LEFT-SIDED LOW BACK PAIN WITH LEFT-SIDED SCIATICA: ICD-10-CM

## 2025-05-19 DIAGNOSIS — G89.29 CHRONIC LEFT-SIDED LOW BACK PAIN WITH LEFT-SIDED SCIATICA: ICD-10-CM

## 2025-05-19 PROCEDURE — 72110 X-RAY EXAM L-2 SPINE 4/>VWS: CPT | Performed by: RADIOLOGY

## 2025-05-19 PROCEDURE — 76856 US EXAM PELVIC COMPLETE: CPT | Performed by: STUDENT IN AN ORGANIZED HEALTH CARE EDUCATION/TRAINING PROGRAM

## 2025-05-19 PROCEDURE — 76830 TRANSVAGINAL US NON-OB: CPT | Performed by: STUDENT IN AN ORGANIZED HEALTH CARE EDUCATION/TRAINING PROGRAM

## 2025-05-19 PROCEDURE — 73502 X-RAY EXAM HIP UNI 2-3 VIEWS: CPT | Mod: LT

## 2025-05-19 PROCEDURE — 73502 X-RAY EXAM HIP UNI 2-3 VIEWS: CPT | Mod: LEFT SIDE | Performed by: RADIOLOGY

## 2025-05-19 PROCEDURE — 72110 X-RAY EXAM L-2 SPINE 4/>VWS: CPT

## 2025-05-19 PROCEDURE — 76856 US EXAM PELVIC COMPLETE: CPT

## 2025-05-21 LAB
25(OH)D3+25(OH)D2 SERPL-MCNC: 39 NG/ML (ref 30–100)
ALBUMIN SERPL-MCNC: 4.5 G/DL (ref 3.6–5.1)
ALP SERPL-CCNC: 52 U/L (ref 37–153)
ALT SERPL-CCNC: 24 U/L (ref 6–29)
ANA PAT SER IF-IMP: ABNORMAL
ANA SER QL IF: POSITIVE
ANA TITR SER IF: ABNORMAL TITER
ANION GAP SERPL CALCULATED.4IONS-SCNC: 7 MMOL/L (CALC) (ref 7–17)
AST SERPL-CCNC: 25 U/L (ref 10–35)
BASOPHILS # BLD AUTO: 40 CELLS/UL (ref 0–200)
BASOPHILS NFR BLD AUTO: 0.9 %
BILIRUB SERPL-MCNC: 0.5 MG/DL (ref 0.2–1.2)
BUN SERPL-MCNC: 17 MG/DL (ref 7–25)
CALCIUM SERPL-MCNC: 10.2 MG/DL (ref 8.6–10.4)
CENTROMERE B AB SER-ACNC: ABNORMAL AI
CHLORIDE SERPL-SCNC: 102 MMOL/L (ref 98–110)
CHOLEST SERPL-MCNC: 245 MG/DL
CHOLEST/HDLC SERPL: 2.1 (CALC)
CK SERPL-CCNC: 91 U/L (ref 21–240)
CO2 SERPL-SCNC: 30 MMOL/L (ref 20–32)
CREAT SERPL-MCNC: 0.76 MG/DL (ref 0.5–1.03)
DSDNA AB SER-ACNC: 2 IU/ML
EGFRCR SERPLBLD CKD-EPI 2021: 95 ML/MIN/1.73M2
ENA JO1 AB SER IA-ACNC: ABNORMAL AI
ENA RNP AB SER-ACNC: ABNORMAL AI
ENA SCL70 AB SER IA-ACNC: ABNORMAL AI
ENA SM AB SER IA-ACNC: ABNORMAL AI
ENA SM+RNP AB SER IA-ACNC: ABNORMAL AI
ENA SS-A AB SER IA-ACNC: ABNORMAL AI
ENA SS-B AB SER IA-ACNC: ABNORMAL AI
EOSINOPHIL # BLD AUTO: 101 CELLS/UL (ref 15–500)
EOSINOPHIL NFR BLD AUTO: 2.3 %
ERYTHROCYTE [DISTWIDTH] IN BLOOD BY AUTOMATED COUNT: 11.7 % (ref 11–15)
ERYTHROCYTE [SEDIMENTATION RATE] IN BLOOD BY WESTERGREN METHOD: 2 MM/H
GLUCOSE SERPL-MCNC: 93 MG/DL (ref 65–99)
HCT VFR BLD AUTO: 40.3 % (ref 35–45)
HDLC SERPL-MCNC: 116 MG/DL
HGB BLD-MCNC: 13.4 G/DL (ref 11.7–15.5)
LABORATORY COMMENT REPORT: ABNORMAL
LDLC SERPL CALC-MCNC: 117 MG/DL (CALC)
LYMPHOCYTES # BLD AUTO: 1399 CELLS/UL (ref 850–3900)
LYMPHOCYTES NFR BLD AUTO: 31.8 %
MCH RBC QN AUTO: 31.7 PG (ref 27–33)
MCHC RBC AUTO-ENTMCNC: 33.3 G/DL (ref 32–36)
MCV RBC AUTO: 95.3 FL (ref 80–100)
MONOCYTES # BLD AUTO: 321 CELLS/UL (ref 200–950)
MONOCYTES NFR BLD AUTO: 7.3 %
NEUTROPHILS # BLD AUTO: 2539 CELLS/UL (ref 1500–7800)
NEUTROPHILS NFR BLD AUTO: 57.7 %
NONHDLC SERPL-MCNC: 129 MG/DL (CALC)
NUCLEOSOME AB SER IA-ACNC: ABNORMAL AI
PLATELET # BLD AUTO: 201 THOUSAND/UL (ref 140–400)
PMV BLD REES-ECKER: 10.7 FL (ref 7.5–12.5)
POTASSIUM SERPL-SCNC: 4.8 MMOL/L (ref 3.5–5.3)
PROT SERPL-MCNC: 7 G/DL (ref 6.1–8.1)
RBC # BLD AUTO: 4.23 MILLION/UL (ref 3.8–5.1)
RIBOSOMAL P AB SER-ACNC: ABNORMAL AI
SODIUM SERPL-SCNC: 139 MMOL/L (ref 135–146)
T4 FREE SERPL-MCNC: 1 NG/DL (ref 0.8–1.8)
TRIGL SERPL-MCNC: 40 MG/DL
TSH SERPL-ACNC: 0.78 MIU/L
WBC # BLD AUTO: 4.4 THOUSAND/UL (ref 3.8–10.8)

## 2025-08-20 DIAGNOSIS — Z13.6 ENCOUNTER FOR LIPID SCREENING FOR CARDIOVASCULAR DISEASE: ICD-10-CM

## 2025-08-20 DIAGNOSIS — Z13.220 SCREENING FOR LIPID DISORDERS: ICD-10-CM

## 2025-08-20 DIAGNOSIS — Z13.220 ENCOUNTER FOR LIPID SCREENING FOR CARDIOVASCULAR DISEASE: ICD-10-CM

## 2025-09-25 ENCOUNTER — APPOINTMENT (OUTPATIENT)
Dept: OBSTETRICS AND GYNECOLOGY | Facility: CLINIC | Age: 50
End: 2025-09-25
Payer: COMMERCIAL